# Patient Record
(demographics unavailable — no encounter records)

---

## 2025-07-10 NOTE — PHYSICAL EXAM
[General Appearance - Well Nourished] : well nourished [General Appearance - Well Developed] : well developed [Sclera] : the sclera and conjunctiva were normal [Hearing Threshold Finger Rub Not Giles] : hearing was normal [Respiration, Rhythm And Depth] : normal respiratory rhythm and effort [Auscultation Breath Sounds / Voice Sounds] : lungs were clear to auscultation bilaterally [Heart Rate And Rhythm] : heart rate was normal and rhythm regular [Heart Sounds] : normal S1 and S2 [Nail Clubbing] : no clubbing  or cyanosis of the fingernails [Motor Tone] : muscle strength and tone were normal [] : no rash [Skin Lesions] : no skin lesions [Affect] : the affect was normal [Mood] : the mood was normal

## 2025-07-10 NOTE — HISTORY OF PRESENT ILLNESS
[FreeTextEntry1] : 53 yo women presents with a history of positive rheumatoid blood test.  states that she was told that she has RA in [place]   has pain over the digits and left shoulder  she was on treatment in  (prednisone 5mg daily / leflunomide 20mg) and was doing well  [place]   she has been off treatment for about 8 months.   since stopping treatment, her pains have returned  she has notice PIP and mCp swelling  morning stiffness for about 3 hours  she is taking Ibuprofen 800 BID with some pain relief   ++dry eye on drops   denies any alopecia, oral lesions, persistent dry mouth, red painful eye, nose bleeding, sinus/ear infections, dysphagia, GERD hoarseness, facial rashes, photosensitivity, sob, cough, cp, hx of serositis, hx low wbc, plts or anemia that required special treatment, Gi issues, Raynaud's, weakness, DVt/PE, miscarriages.  pregnant x2 FT, + preeclampsia ( first baby)   denies psoriasis, nail changes, Sauage digits, tennis elbow, de Quervain, plantar fasciitis, Achilles pain, back pain, FH of psoriasis, UC or Crohn's  NO hx of STds  meds: Ibuprofen and vit d  SH from  , moving to [place] next week

## 2025-07-10 NOTE — ASSESSMENT
[FreeTextEntry1] : RA   --will await labs, serologies  --start prednisone  --will consider starting leflunoamide provided labs are WNL  --she is to f/u with rheum in [place]